# Patient Record
Sex: FEMALE | Race: WHITE
[De-identification: names, ages, dates, MRNs, and addresses within clinical notes are randomized per-mention and may not be internally consistent; named-entity substitution may affect disease eponyms.]

---

## 2019-12-03 ENCOUNTER — HOSPITAL ENCOUNTER (EMERGENCY)
Dept: HOSPITAL 25 - ED | Age: 44
Discharge: HOME | End: 2019-12-03
Payer: COMMERCIAL

## 2019-12-03 VITALS — SYSTOLIC BLOOD PRESSURE: 109 MMHG | DIASTOLIC BLOOD PRESSURE: 78 MMHG

## 2019-12-03 DIAGNOSIS — R53.83: Primary | ICD-10-CM

## 2019-12-03 DIAGNOSIS — F41.9: ICD-10-CM

## 2019-12-03 DIAGNOSIS — F32.9: ICD-10-CM

## 2019-12-03 LAB
ALBUMIN SERPL BCG-MCNC: 4.2 G/DL (ref 3.2–5.2)
ALBUMIN/GLOB SERPL: 1.7 {RATIO} (ref 1–3)
ALP SERPL-CCNC: 44 U/L (ref 34–104)
ALT SERPL W P-5'-P-CCNC: 13 U/L (ref 7–52)
ANION GAP SERPL CALC-SCNC: 5 MMOL/L (ref 2–11)
AST SERPL-CCNC: 14 U/L (ref 13–39)
BASOPHILS # BLD AUTO: 0 10^3/UL (ref 0–0.2)
BUN SERPL-MCNC: 6 MG/DL (ref 6–24)
BUN/CREAT SERPL: 9.8 (ref 8–20)
CALCIUM SERPL-MCNC: 9.7 MG/DL (ref 8.6–10.3)
CHLORIDE SERPL-SCNC: 108 MMOL/L (ref 101–111)
EOSINOPHIL # BLD AUTO: 0 10^3/UL (ref 0–0.6)
GLOBULIN SER CALC-MCNC: 2.5 G/DL (ref 2–4)
GLUCOSE SERPL-MCNC: 151 MG/DL (ref 70–100)
HCG SERPL QL: < 0.6 MIU/ML
HCO3 SERPL-SCNC: 28 MMOL/L (ref 22–32)
HCT VFR BLD AUTO: 39 % (ref 35–47)
HGB BLD-MCNC: 13.4 G/DL (ref 12–16)
LYMPHOCYTES # BLD AUTO: 1 10^3/UL (ref 1–4.8)
MCH RBC QN AUTO: 30 PG (ref 27–31)
MCHC RBC AUTO-ENTMCNC: 34 G/DL (ref 31–36)
MCV RBC AUTO: 87 FL (ref 80–97)
MONOCYTES # BLD AUTO: 0.3 10^3/UL (ref 0–0.8)
NEUTROPHILS # BLD AUTO: 3.3 10^3/UL (ref 1.5–7.7)
NRBC # BLD AUTO: 0 10^3/UL
NRBC BLD QL AUTO: 0
PLATELET # BLD AUTO: 257 10^3/UL (ref 150–450)
POTASSIUM SERPL-SCNC: 3.7 MMOL/L (ref 3.5–5)
PROT SERPL-MCNC: 6.7 G/DL (ref 6.4–8.9)
RBC # BLD AUTO: 4.47 10^6 /UL (ref 3.7–4.87)
SODIUM SERPL-SCNC: 141 MMOL/L (ref 135–145)
WBC # BLD AUTO: 4.6 10^3/UL (ref 3.5–10.8)

## 2019-12-03 PROCEDURE — 99282 EMERGENCY DEPT VISIT SF MDM: CPT

## 2019-12-03 PROCEDURE — 86753 PROTOZOA ANTIBODY NOS: CPT

## 2019-12-03 PROCEDURE — 84702 CHORIONIC GONADOTROPIN TEST: CPT

## 2019-12-03 PROCEDURE — 80053 COMPREHEN METABOLIC PANEL: CPT

## 2019-12-03 PROCEDURE — 86666 EHRLICHIA ANTIBODY: CPT

## 2019-12-03 PROCEDURE — 96360 HYDRATION IV INFUSION INIT: CPT

## 2019-12-03 PROCEDURE — 86618 LYME DISEASE ANTIBODY: CPT

## 2019-12-03 PROCEDURE — 85025 COMPLETE CBC W/AUTO DIFF WBC: CPT

## 2019-12-03 PROCEDURE — 36415 COLL VENOUS BLD VENIPUNCTURE: CPT

## 2019-12-03 PROCEDURE — 93005 ELECTROCARDIOGRAM TRACING: CPT

## 2019-12-03 NOTE — ED
Dizziness





- HPI Summary


HPI Summary: 





Patient is a 45 y/o F presenting to the ED for a chief complaint of dizziness. 

Patient is present with her . She describes feeling she would have a 

syncopal episode on 19 and had dizziness, shaking, and lightheadedness at 

that time. She also notes generalized weakness and fatigue that began a few 

days ago. She is crying in the room and notes having anxiety about her health. 

Patient is unsure if she has lost weight since her symptom onset. She denies 

any aggravating or alleviating factors. Two weeks ago, patient was diagnosed 

with Giardia after a positive stool culture at [facility] after having diarrhea 

and nausea. She was prescribed Flagyl for 7 days, which she completed on  at 06:00. She previously had a Giardia infection 2 years ago with symptoms 

of nausea and diarrhea. PMHx is significant for anxiety and depression and PSHx 

is significant for . 





- History Of Current Complaint


Chief Complaint: EDDizziness


Stated Complaint: FEELS LIKE I AM MADAY TO PASS OUT


Time Seen by Provider: 19 13:53


Hx Obtained From: Patient


Onset/Duration: Resolved


Severity Initially: Moderate


Severity Currently: Moderate


Character: Dizzy


Aggravating Factor(s): Nothing


Alleviating Factor(s): Nothing


Associated Signs And Symptoms: Positive: Nausea - From prior Giardia, Diarrhea 

- From prior Giardia





- Allergies/Home Medications


Allergies/Adverse Reactions: 


 Allergies











Allergy/AdvReac Type Severity Reaction Status Date / Time


 


No Known Allergies Allergy   Verified 19 14:11














PMH/Surg Hx/FS Hx/Imm Hx


Previously Healthy: Yes


Endocrine/Hematology History: Reports: Other Endocrine/Hematological Disorders 

- Giardia


   Denies: Hx Diabetes, Hx Thyroid Disease


Cardiovascular History: 


   Denies: Hx Hypertension


Respiratory History: 


   Denies: Hx Asthma, Hx Chronic Obstructive Pulmonary Disease (COPD)


GI History: 


   Denies: Hx Ulcer


Sensory History: 


   Denies: Hx Legally Blind, Hx Deafness


Opthamlomology History: 


   Denies: Hx Legally Blind


EENT History: 


   Denies: Hx Deafness


Psychiatric History: Reports: Hx Anxiety, Hx Depression - formerly on Zoloft





- Surgical History


Surgical History: Yes


Surgery Procedure, Year, and Place: csection .  left knee arthroscopy 


Infectious Disease History: No


Infectious Disease History: 


   Denies: Hx Clostridium Difficile, Hx Hepatitis, Hx Human Immunodeficiency 

Virus (HIV), Hx of Known/Suspected MRSA, Hx Shingles, Traveled Outside the [place] 

in Last 30 Days





- Family History


Known Family History: 


   Negative: Diabetes





- Social History


Lives: With Family


Alcohol Use: None


Hx Substance Use: No


Substance Use Type: Reports: None


Hx Tobacco Use: No


Smoking Status (MU): Never Smoked Tobacco


Have You Smoked in the Last Year: No





Review of Systems


Positive: Fatigue, Other - Unsure if positive weight loss


Positive: Diarrhea - From Giardia infection, Nausea - From Giardia infection


Neurological: Other - Positive dizziness and lightheadedness


Positive: Weakness - Generalized


Positive: Anxious


All Other Systems Reviewed And Are Negative: Yes





Physical Exam





- Summary


Physical Exam Summary: 








Constitutional: Well-developed, Well-nourished, Tearful 


Skin: Warm, Dry


HENT: Normocephalic; Atraumatic


Eyes: Conjunctiva normal


Neck: Musculoskeletal ROM normal neck. (-) JVD, (-) Stridor, (-) Nuchal rigidity


Cardio: Rhythm regular, rate normal, Heart sounds normal; Intact distal pulses; 

Radial pulses are 2+ and symmetric. (-) Murmur


Pulmonary/Chest wall: Effort normal. (-) Respiratory distress, (-) Wheezes, (-) 

Rales


Abd: Soft, (-) tenderness, (-) Distension, (-) Guarding, (-) Rebound


Musculoskeletal: (-) Edema


Lymph: (-) Cervical adenopathy


Neuro: Alert, Oriented x3, Ambulates w steady gait 


Psych: Anxious and tearful.


Triage Information Reviewed: Yes


Vital Signs On Initial Exam: 


 Initial Vitals











Temp Pulse Resp BP Pulse Ox


 


 100.3 F   74   16   131/85   99 


 


 19 12:01  19 12:01  19 12:01  19 12:01  19 12:01











Vital Signs Reviewed: Yes





Procedures





- Sedation


Patient Received Moderate/Deep Sedation with Procedure: No





Diagnostics





- Vital Signs


 Vital Signs











  Temp Pulse Resp BP Pulse Ox


 


 19 12:01  100.3 F  74  16  131/85  99














- Laboratory


Result Diagrams: 


 19 14:33





 19 14:33


Lab Statement: Any lab studies that have been ordered have been reviewed, and 

results considered in the medical decision making process.





- EKG


  ** 14:16


Cardiac Rate: NL - 66 BPM


EKG Rhythm: Sinus Rhythm


ST Segment: Normal


Ectopy: None


Summary of EKG Findings: An EKG at 14:16 reveals normal sinus rhythm 66 BPM, 

nml axis, nml intervals. No STEMI. No acute changes.  Reviewed and interpreted 

by Dr. Carrizales.





Re-Evaluation





- Re-Evaluation


  ** Second Eval


Change: Improved - feeling better, NAD. Labs w/o e/o infectious cause or 

electrolyte abnl. Orthostatics WNL. Given GI follow up.





Dizzy Course/Dx





- Course


Course Of Treatment: 45 y/o F w recent giardia, anxiety/dep p/w fatigue.  - PE 

well appearing, tearful. Reporting fatigue and lightheadedness.  - Dizziness ddx

:  Differential diagnosis includes:  Cardiac causes - will check EKG, troponin, 

get orthostatics.  Electrolyte disturbances - will check CMP.  Anemia - will 

check CBC.  No vertigo





- Diagnoses


Provider Diagnoses: 


 Fatigue








Discharge ED





- Sign-Out/Discharge


Documenting (check all that apply): Patient Departure - Discharge





- Discharge Plan


Condition: Stable


Disposition: HOME


Patient Education Materials:  Lightheadedness (ED)


Referrals: 


Farhat Zuniga MD [Primary Care Provider] - 


Ina Ferrer DO [Doctor of Osteopathy] - 


Additional Instructions: 


You were seen in the emergency department for light headedness.  Your labs did 

not show any cause for this, your vital signs are stable.


Please follow up with your primary care doctor in next 2-3 days and return to 

emergency department for worsening or concerning symptoms.


You can follow-up with gastroenterology regarding your Giardia.


It was a pleasure taking care of you today.





- Billing Disposition and Condition


Condition: STABLE


Disposition: Home





- Attestation Statements


Document Initiated by Magoibe: Yes


Documenting Scribe: Virginia Sanchez


Provider For Whom Jasen is Documenting (Include Credential): Thomas Carrizales MD


Scribe Attestation: 


Virginia ARAIZA scribed for Thomas Carrizales MD on 19 at 1003. 


Scribe Documentation Reviewed: Yes


Provider Attestation: 


The documentation as recorded by the Virginia cary accurately reflects 

the service I personally performed and the decisions made by me, Thomas Carrizales MD


Status of Scribe Document: Viewed

## 2019-12-06 LAB
ANAPLASMA PHAGOCYTOPHILIUM: (no result) TITER
EHRLICHIA CHAFFEENSIS IGG AB: (no result) TITER